# Patient Record
Sex: FEMALE | Race: WHITE | Employment: FULL TIME | URBAN - METROPOLITAN AREA
[De-identification: names, ages, dates, MRNs, and addresses within clinical notes are randomized per-mention and may not be internally consistent; named-entity substitution may affect disease eponyms.]

---

## 2020-01-13 ENCOUNTER — APPOINTMENT (OUTPATIENT)
Dept: NUTRITION | Age: 35
End: 2020-01-13

## 2020-01-21 ENCOUNTER — APPOINTMENT (OUTPATIENT)
Dept: NUTRITION | Age: 35
End: 2020-01-21

## 2020-03-02 ENCOUNTER — HOSPITAL ENCOUNTER (OUTPATIENT)
Dept: NUTRITION | Age: 35
Discharge: HOME | End: 2020-03-02
Payer: COMMERCIAL

## 2020-03-02 PROCEDURE — 97802 MEDICAL NUTRITION INDIV IN: CPT

## 2020-03-02 NOTE — PROGRESS NOTES
[address] [place] [address] 51, 45 [place], [place], 70 [facility]  Phone: [phone]  Fax: [phone]   Nutrition Assessment - Medical Nutrition Therapy   Outpatient Initial Evaluation         Patient Name: Lisbet Robles : 1985   Treatment Diagnosis: Obesity, HTN   Referral Source: Nayeli Butts* Start of Care [facility]): 3/2/2020     Gender: female Age: 27 y.o. Ht: 68 in Wt:  225.4 lb     BMI: 34.3 BMR   Male  BMR Female 1770   Anthropometrics Assessment: obesity     Past Medical History includes: HTN, hypothyroid, fatty liver     Pertinent Medications:   Lisinopril, bystolic, levothyroxine, cetirizine hydrochloride     Biochemical Data:   No results found for: HBA1C, HGBE8, LSG8HFCR, IWL8RNCS  No results found for: CHOL, CHOLPOCT, CHOLX, CHLST, CHOLV, HDL, HDLPOC, HDLP, LDL, LDLCPOC, LDLC, DLDLP, VLDLC, VLDL, TGLX, TRIGL, TRIGP, TGLPOCT, CHHD, CHHDX  No results found for: GPT, ALT, SGOT, GGT, GGTP, AP, APIT, APX, CBIL, TBIL, TBILI  No results found for: KALE, CREAPOC, ACREA, CREA, REFC3, REFC4  No results found for: BUN, BUNPOC, IBUN, MBUNV, BUNV  No results found for: MCACR, MCA1, MCA2, MCA3, MCAU, MCAU2, MCALPOCT     Subjective/Assessment:   Patient seen today in nutrition for initial assessment for obesity and HTN. She reports fluctuating between 210-250 for the past 2 years despite different diets and exercise. She reports her highest weight was just under 300 pounds and her lowest weight in adulthood was 160 pounds about 6-7 years ago. She reports wanting to get to a healthier place. Nutrition Diagnosis: Obesity related to excessive energy intake and physical inactivity as evidenced by elevated BMI, over consumption of calories and infrequent physical activity.        Current Eating Patterns: Diet recall: (note: she had braces put on in January and is currently avoiding hard foods or things that might get stuck in her braces.)    Breakfast: eggs with vegetables and sausage or oatmeal with fruit and maybe yogurt  Snack: fig bar  Lunch: protein, vegetable and maybe carb  Dinner: protein, vegetable and maybe carb  Snack: occasionally ice cream of cookies    Fluids: water, occasionally soda if she is eating out. Reports no alcohol. She reports decreasing eating out, fast food and normally cooking at home. She reports normally meal prepping her food for the week. 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 She does have a set schedule each week being Wednesday through Saturday or Thursday through Saturday. Exercise: 1-1.5 hours per week of cardio and strength    No food allergies  Supplements:vitamin D 3 5000 internation units per day, omega 3 and probiotic daily       Estimate Needs   Calories: 1770  Protein: 60-80 Carbs: 90     Kcal/day  g/day  g/day      Education provided:   Intervention: Education, Comprehensive - reviewed prescription below. Nutrition education was provided on medical nutrition therapy for obesity. Patient was educated on the food sources of protein, carbohydrate and non-starchy vegetables. The patient was educated on food label reading and appropriate portion sizes. We developed a meal plan with multiple options for each meal and snack. The patient was provided with options for healthy meals when eating out. The patient was provided with my contact info. Patient asked questions and indicated understanding.        Handouts Provided: [x]  Carbohydrates  [x]  Protein  [x]  Fiber  [x]  Serving Sizes  [x]  Meal Ideas  [x]  Non-starchy Vegetables []  Diabetes  []  Cholesterol  []  Sodium  [x]  Food labels  []  Snacks  []  Others:   Information Reviewed with: Patient    Readiness to Change Stage: []  Pre-contemplative    [x]  Contemplative  []  Preparation               []  Action                  []  Maintenance   Potential Barriers to Learning: []  Decline in memory    []  Language barrier   [] Other:  []  Emotional                  []  Limited mobility  []  Lack of motivation     [] Vision, hearing or cognitive impairment   Expected Compliance: Good      Nutritional Goal - To promote lifestyle changes to result in:    [x]  Weight loss  []  Improved diabetic control  []  Decreased cholesterol levels  [x]  Decreased blood pressure  []  Weight maintenance []  Preventing any interactions associated with food allergies  []  Adequate weight gain toward goal weight  []  Other:        Nutrition Prescription/  Patient Goals:      Limit intake of carbohydrates to no more than.   o 90 grams of carbohydrate per day. o 15-30 grams of carbohydrate per meal.  o 0-15 grams of carbohydrate per snack.  Have 3 meals per day and 1-2 snacks (as needed).  Aim for at least 3 ounces (deck of cards) of a protein choice at every meal and 1-2 ounces at snack times.  Choose one or more vegetables to include with lunch, dinner and snacks (as needed).  Have at least 64 ounces of fluids per day. Choose sugar-free beverages only.      [address] daily       Dietitian Signature: Dayday Mejía MS, RD Date: 3/2/2020   Follow-up: April 6th at 10:00 at [facility] Time: 11:56 AM

## 2021-03-22 ENCOUNTER — HOSPITAL ENCOUNTER (OUTPATIENT)
Dept: NUTRITION | Age: 36
Discharge: HOME | End: 2021-03-22
Payer: COMMERCIAL

## 2021-03-22 PROCEDURE — 97803 MED NUTRITION INDIV SUBSEQ: CPT

## 2021-03-22 NOTE — PROGRESS NOTES
NUTRITION - FOLLOW-UP TREATMENT NOTE  Patient Name: Otoniel Jeffers         Date: 3/22/2021  : 1985    YES/NO Patient  Verified  Diagnosis: Obesity and HTN   In time:   1:00             Out time:   1:45   Total Treatment Time (min):   45 minutes   Patient Location: [place]   Provider Location: [facility]  Otoniel Jeffers, was evaluated through a synchronous (real-time) audio-video encounter. The patient (or guardian if applicable) is aware that this is a billable service. Verbal consent to proceed has been obtained within the past 12 months. The visit was conducted pursuant to the emergency declaration under the [facility], [address] [place] [address] authority and the [employer] Act. Patient identification was verified, and a caregiver was present when appropriate. The patient was located in a state where the provider was credentialed to provide care. --Ernesto Yiveda on 3/22/2021 at 2:02 PM    An electronic signature was used to authenticate this note. Changes in medication or medical history? Any new allergies, surgeries or procedures? YES       Patient seen today in nutrition for follow up. Patient reports many changes since our last appointment, 3/2/2020. She reports diagnosis of Hashimoto's and primary aldosteronism. She was taken off lisinopril and is now on spironolactone and bystolic for HTN. She reports increased insulin levels without any changes in her blood sugar. She also reports elevated insulin growth factor. She was diagnosed with sleep apnea and is using a CPAP machine currently. She had food sensitivity testing completed which showed she was sensitive to the following: almond, chicken, coffee, crab, egg (white and yolk), eggplant, gluten, milk, mushroom, bell pepper, pineapple, sunflower, wheat, and yeast (brewers and bakers).  She reports eating eggs and chicken daily without any noticeable issues. She reports she does not consume coffee, eggplant, mushroom or pineapple currently. Today we discussed different options to trial eliminating some of the above foods that she actually eats. We decided initially to trial gluten and milk free diet and to keep a food and symptom log. Diet log:   Breakfast: 2 eggs with either regular milk or oat milk, onion and cauliflower rice, butter and turkey damian and fruit  Lunch: beef and pork davian with vegetables and either cauliflower or regular rice  Dinner: roasted vegetables, chicken and potato and fruit  Snacks: fruit, vegetables or veggie sticks - pea pod version    Fluids: water: 64 ounces per day    She reports trying to reduce intake of cookies, bread, and sugar added items. Supplements: selenium, B 12, ultimate omega, omega 3. She has been avoiding nightshade vegetables for over a year. Note: She braces and is currently avoiding hard foods or things that might get stuck in her braces. Current Wt: 225 Previous Wt: 225.4 Wt Change: 0     Initial Wt: 225.4 Total Wt change: 0 Height: 68     Nutrition Diagnosis        Diagnosis Status: Nutrition Diagnosis: Obesity related to excessive energy intake and physical inactivity as evidenced by elevated BMI, over consumption of calories and infrequent physical activity. []  Improved [x]  No Change    []  Declined        Achievement of Goals:   She has inconsistently over the last year improved in all of her goals. · Limit intake of carbohydrates to no more than. ? 90 grams of carbohydrate per day. ? 15-30 grams of carbohydrate per meal.  ? 0-15 grams of carbohydrate per snack.     · Have 3 meals per day and 1-2 snacks (as needed).     · Aim for at least 3 ounces (deck of cards) of a protein choice at every meal and 1-2 ounces at snack times.     · Choose one or more vegetables to include with lunch, dinner and snacks (as needed).     · Have at least 64 ounces of fluids per day.  Choose sugar-free beverages only.      · Keep Food Log daily     Patient Education:  [x]  Review current plan with patient   []  Other:    Handouts/  Information Provided: []  Carbohydrates  []  Protein  []  Fiber  []  Serving Sizes  []  Fluids  []  Snacks []  Diabetes  []  Cholesterol  []  Sodium  []  Food Labels  []  Non starchy vegetables  []  Others:      New Patient Goals: 1) Keep food log for 3 days and email to Dietitian  2) After 3 day food log start gluten and dairy free diet. Continue to keep a food and symptom log. 3) We will schedule a follow up in June after she is done with next lab work and doctors appointment.         PLAN    []  Continue on current plan []  Follow-up PRN   []  Discharge due to :    [x]  Next appt: June 2021, virtual     Dietitian: Rl Foley MS, RD    Date: 3/22/2021 Time: 2:02 PM

## 2021-06-14 ENCOUNTER — HOSPITAL ENCOUNTER (OUTPATIENT)
Dept: NUTRITION | Age: 36
Discharge: HOME | End: 2021-06-14
Payer: COMMERCIAL

## 2021-06-14 PROCEDURE — 97803 MED NUTRITION INDIV SUBSEQ: CPT

## 2021-06-14 NOTE — PROGRESS NOTES
NUTRITION - FOLLOW-UP TREATMENT NOTE  Patient Name: Allie Check         Date: 2021  : 1985    YES/NO Patient  Verified  Diagnosis: Obesity and HTN   In time:   10:00             Out time:   10:30   Total Treatment Time (min):   30 minutes   Patient Location: [place]   Provider Location: [facility]  Allie Gamboa, was evaluated through a synchronous (real-time) audio-video encounter. The patient (or guardian if applicable) is aware that this is a billable service. Verbal consent to proceed has been obtained within the past 12 months. The visit was conducted pursuant to the emergency declaration under the [facility], [address] [place] [address] authority and the [employer] Act. Patient identification was verified, and a caregiver was present when appropriate. The patient was located in a [facility] where the provider was credentialed to provide care. --Orma Schwab on 2021 at 2:02 PM    An electronic signature was used to authenticate this note. Changes in medication or medical history? Any new allergies, surgeries or procedures?   no       Patient seen today in nutrition for follow up. She had a repeated food sensitivity test completed which showed continued sensitivity to the following: almond, chicken, coffee, crab, egg (white and yolk), eggplant, gluten, milk, mushroom, bell pepper, pineapple, sunflower, wheat, and yeast (brewers and bakers). She is currently avoiding all dairy, gluten, nightshades, chicken, eggs, peppers, mushrooms, seafood and nuts. She reports over the last 3 months of eliminating the above foods she is having less inflammation, allergy symptoms, and more energy. She is also no longer constipated with a normal bowel movement 1-2 times per day.     Diet log:   2021  8:37 am wake up, take synthroid and drink water  9:30 am breakfast smoothie - 1.5 cup spinach, 1/2 cup strawberry, 1/2 cup garcia, 1/3 cup blueberries, 1/2 cup orange juice, 1 scoop Vital proteins collagen peptides   14:10 pm lunch - 3oz beef steak strips w/ 1 cup spinach, 0.3 oz red onion, 1/2 mini cucumber, 2 tbsp sriracha hummus  16:15 pm snack - 1.2 oz lightly salted cashews    18:15 pm dinner - Chipotle burrito bowl - carnitas, light brown rice, light fontanez beans, fajita veggies (didn't eat bell peppers), guacamole, lettuce   20:35 pm bedtime     Fluids: water: 64 ounces per day    Supplements: selenium, B 12, ultimate omega, omega 3, digestive enzymes. She has been avoiding nightshade vegetables for over a year. Note: She braces and is currently avoiding hard foods or things that might get stuck in her braces. Current Wt: 212 Previous Wt: 225 Wt Change: -13     Initial Wt: 225.4 Total Wt change: -13 Height: 68     Nutrition Diagnosis        Diagnosis Status: Nutrition Diagnosis: Obesity related to excessive energy intake and physical inactivity as evidenced by elevated BMI, over consumption of calories and infrequent physical activity. [x]  Improved []  No Change    []  Declined        Achievement of Goals:   She has inconsistently over the last year improved in all of her goals. · Avoid gluten, dairy and night shades    · Limit intake of carbohydrates to no more than. ? 90 grams of carbohydrate per day. ? 15-30 grams of carbohydrate per meal.  ? 0-15 grams of carbohydrate per snack.     · Have 3 meals per day and 1-2 snacks (as needed).     · Aim for at least 3 ounces (deck of cards) of a protein choice at every meal and 1-2 ounces at snack times.     · Choose one or more vegetables to include with lunch, dinner and snacks (as needed).     · Have at least 64 ounces of fluids per day.  Choose sugar-free beverages only.      · Keep Food Log daily     Patient Education:  [x]  Review current plan with patient   []  Other:    Handouts/  Information Provided: []  Carbohydrates  [] Protein  []  Fiber  []  Serving Sizes  []  Fluids  []  Snacks []  Diabetes  []  Cholesterol  []  Sodium  []  Food Labels  []  Non starchy vegetables  []  Others:      New Patient Goals: Continue current plan of avoiding: dairy, gluten and nightshades. Rowan introduction of cheese in 1 month.      PLAN    []  Continue on current plan []  Follow-up PRN   []  Discharge due to :    [x]  Next appt: August/September 2021, virtual     Dietitian: Ayan Diamond MS, RD    Date: 6/14/2021 Time: 10:30 AM

## 2021-08-24 ENCOUNTER — HOSPITAL ENCOUNTER (OUTPATIENT)
Dept: NUTRITION | Age: 36
Discharge: HOME | End: 2021-08-24
Payer: COMMERCIAL

## 2021-08-24 PROCEDURE — 97803 MED NUTRITION INDIV SUBSEQ: CPT

## 2021-08-24 NOTE — PROGRESS NOTES
NUTRITION - FOLLOW-UP TREATMENT NOTE  Patient Name: Rachel Gregory         Date: 2021  : 1985    YES Patient  Verified  Diagnosis: Obesity and HTN   In time:   3:00             Out time:   3:45   Total Treatment Time (min):   45 minutes   Patient Location: [place]   Provider Location: [facility]  Rachel Gregory, was evaluated through a synchronous (real-time) audio-video encounter. The patient (or guardian if applicable) is aware that this is a billable service. Verbal consent to proceed has been obtained within the past 12 months. The visit was conducted pursuant to the emergency declaration under the [address], [facility] authority and the [employer] Act. Patient identification was verified, and a caregiver was present when appropriate. The patient was located in a [facility] where the provider was credentialed to provide care. --Rhina Ignacio on 2021 at 2:02 PM    An electronic signature was used to authenticate this note. Changes in medication or medical history? Any new allergies, surgeries or procedures?   no       Patient seen today in nutrition for follow up. She reports 3 weeks ago starting a new birth control, Lo Loestrin Fe 1/10. She reports a 5 pound increase in weight within 7 days of starting the birth control. She reports initially more hunger and cravings. She reports her hunger and cravings are slowly decreasing as she has now been on the new birth control for 3 weeks. She is now back down to 216. She reports during the last 3 weeks she did not keep a food log because she was eating more often and had gotten off track with the increase hunger and cravings. She reports her glucose level is staying around  when tested by her PCP. She noted her insulin level is elevated with the last 3 tests being 36, 57.1, 45.7.     Note: She had a repeated food sensitivity test completed prior to previous appointment which showed continued sensitivity to the following: almond, chicken, coffee, crab, egg (white and yolk), eggplant, gluten, milk, mushroom, bell pepper, pineapple, sunflower, wheat, and yeast (brewers and bakers). She is currently avoiding all dairy, gluten, nightshades, eggs, peppers, and mushrooms. She reports over the last 5 months of eliminating the above foods she is having less inflammation, allergy symptoms, and more energy. She is also no longer constipated with a normal bowel movement 1-2 times per day. She is also taking a digestive enzyme which she feels is helping with her constipation. She tried to reintroduce sheep cheese, 1 oz every other day but stopped due to increased congestion. She is not sure if the congestion was from the cheese or the increased seasonal allergens at the time. She wants to try again in about 1 month. Diet log:   Breakfast: apple, chicken and sauteed vegetables  Snack: cashews  Lunch: ramen with vegetables and shrimp  Snack: kind bar or humus and carrots  Dinner: 2 sushi rolls and salad with miso soup    Fluids: water: 64 ounces per day    Supplements: selenium, B 12, ultimate omega, omega 3, digestive enzymes. She has been avoiding nightshade vegetables for over a year. She is going to try adding back in a little green pepper that is cooked to see if she still has any issues. Note: She has braces and is currently avoiding hard foods or things that might get stuck in her braces. Current Wt: 216 Previous Wt: 212 Wt Change: +4     Initial Wt: 225.4 Total Wt change: -9.4 Height: 68     Nutrition Diagnosis        Diagnosis Status: Nutrition Diagnosis: Obesity related to excessive energy intake and physical inactivity as evidenced by elevated BMI, over consumption of calories and infrequent physical activity.         [x]  Improved []  No Change    []  Declined        Achievement of Goals:    · Avoid gluten, dairy and night shades - Met    · Limit intake of carbohydrates to no more than. - Not met  ? 90 grams of carbohydrate per day. ? 15-30 grams of carbohydrate per meal.  ? 0-15 grams of carbohydrate per snack.     · Have 3 meals per day and 1-2 snacks (as needed). - Met     · Aim for at least 3 ounces (deck of cards) of a protein choice at every meal and 1-2 ounces at snack times. - Met     · Choose one or more vegetables to include with lunch, dinner and snacks (as needed). Met     · Have at least 64 ounces of fluids per day. Choose sugar-free beverages only. Met     · Keep Food Log daily - Not met     Patient Education:  [x]  Review current plan with patient   []  Other:    Handouts/  Information Provided: []  Carbohydrates  []  Protein  []  Fiber  []  Serving Sizes  []  Fluids  []  Snacks []  Diabetes  []  Cholesterol  []  Sodium  []  Food Labels  []  Non starchy vegetables  []  Others:      New Patient Goals: · Continue current plan of avoiding: dairy, gluten and nightshades. · Trial introduction of cheese in 1 month.   · Decrease portion of carbs to 60-90 grams per day  · Increase exercise to 30 minutes 3-5 days per week  · Restart food log and email to dietitian for review weekly       PLAN    []  Continue on current plan []  Follow-up PRN   []  Discharge due to :    [x]  Next appt: October 12th at 10:00, virtual     Dietitian: Theresa March MS, RD    Date: 8/24/2021 Time: 10:30 AM

## 2021-10-08 ENCOUNTER — HOSPITAL ENCOUNTER (OUTPATIENT)
Dept: NUTRITION | Age: 36
Discharge: HOME | End: 2021-10-08
Payer: COMMERCIAL

## 2021-10-08 PROCEDURE — 97803 MED NUTRITION INDIV SUBSEQ: CPT

## 2021-10-08 NOTE — PROGRESS NOTES
NUTRITION - FOLLOW-UP TREATMENT NOTE  Patient Name: Nick Gentile         Date: 10/8/2021  : 1985    YES Patient  Verified  Diagnosis: Obesity and HTN   In time:   9:30            Out time:   10:00   Total Treatment Time (min):   30 minutes   Patient Location: [place]   Provider Location: [facility]  Nick Gentile, was evaluated through a synchronous (real-time) audio-video encounter. The patient (or guardian if applicable) is aware that this is a billable service. Verbal consent to proceed has been obtained within the past 12 months. The visit was conducted pursuant to the emergency declaration under the [facility], [address] [place] [address] authority and the [employer] Act. Patient identification was verified, and a caregiver was present when appropriate. The patient was located in a [facility] where the provider was credentialed to provide care. --Lana Bernal on 10/8/2021 at 2:02 PM    An electronic signature was used to authenticate this note. Changes in medication or medical history? Any new allergies, surgeries or procedures?   no       Patient seen today in nutrition for follow up. She currently on the birth control, Lo Loestrin Fe 1/10. She reported a 5 pound increase in weight within 7 days of starting the birth control. She reported initially more hunger and cravings. She reports her hunger and cravings are slowly decreasing as she has now been on the new birth control for 2 months. She reports her glucose level is staying around  when tested by her PCP. She noted her insulin level is elevated with the last 3 tests being 36, 57.1, 45.7.     Note: She had a repeated food sensitivity test completed prior to previous appointment which showed continued sensitivity to the following: almond, chicken, coffee, crab, egg (white and yolk), eggplant, gluten, milk, mushroom, bell pepper, pineapple, sunflower, wheat, and yeast (brewers and bakers). She is currently avoiding all dairy, gluten, nightshades, eggs, peppers, and mushrooms. She reports over the last 6 months of eliminating the above foods she is having less inflammation, allergy symptoms, and more energy. She is also no longer constipated with a normal bowel movement 1-2 times per day. She is also taking a digestive enzyme which she feels is helping with her constipation. She started a probiotic since our last appointment and feels it is helping to curb her hunger. She would like to try to reintroduce sheep cheese again. The last time she tried back in June she took 1 oz every other day but stopped due to increased congestion. She is not sure if the congestion was from the cheese or the increased seasonal allergens at the time. Diet log:   Breakfast: apple, chicken and sauteed vegetables  Snack: cashews  Lunch: ramen with vegetables and shrimp  Snack: kind bar or humus and carrots  Dinner: 2 sushi rolls and salad with miso soup    Fluids: water: 64 ounces per day    Supplements: selenium, B 12, ultimate omega, omega 3, digestive enzymes, probiotic. She has been avoiding nightshade vegetables for over a year. She has added back in a little green pepper that is cooked without any issues. Note: She has braces and is currently avoiding hard foods or things that might get stuck in her braces. Current Wt: 219 Previous Wt: 215 Wt Change: +4     Initial Wt: 225.4 Total Wt change: -6.4 Height: 68     Nutrition Diagnosis        Diagnosis Status: Nutrition Diagnosis: Obesity related to history of excessive energy intake and physical inactivity as evidenced by elevated BMI. [x]  Improved []  No Change    []  Declined        Achievement of Goals:    · Avoid gluten, dairy and night shades - Met    · Limit intake of carbohydrates to no more than. - Not met consistently  ? 90 grams of carbohydrate per day.   ? 15-30 grams of carbohydrate per meal.  ? 0-15 grams of carbohydrate per snack.     · Have 3 meals per day and 1-2 snacks (as needed). - Met     · Aim for at least 3 ounces (deck of cards) of a protein choice at every meal and 1-2 ounces at snack times. - Met     · Choose one or more vegetables to include with lunch, dinner and snacks (as needed). Met     · Have at least 64 ounces of fluids per day. Choose sugar-free beverages only. Met     · Keep Food Log daily - Not met     Patient Education:  [x]  Review current plan with patient   []  Other:    Handouts/  Information Provided: []  Carbohydrates  []  Protein  []  Fiber  []  Serving Sizes  []  Fluids  []  Snacks []  Diabetes  []  Cholesterol  []  Sodium  []  Food Labels  []  Non starchy vegetables  []  Others:      New Patient Goals: · Continue current plan of avoiding: dairy, gluten and nightshades. · Trial introduction of cheese. · Decrease portion of carbs to 60 grams per day  · Recommend 1500 calories per day  · Increase exercise to 30 minutes 3-5 days per week         PLAN    []  Continue on current plan []  Follow-up PRN   []  Discharge due to :    [x]  Next appt: Advised to follow up with another one of our dietitians as needed.      Dietitian: German Cutler MS, RD    Date: 10/8/2021 Time: 10:30 AM